# Patient Record
Sex: MALE | Race: WHITE | URBAN - METROPOLITAN AREA
[De-identification: names, ages, dates, MRNs, and addresses within clinical notes are randomized per-mention and may not be internally consistent; named-entity substitution may affect disease eponyms.]

---

## 2023-03-04 ENCOUNTER — HOSPITAL ENCOUNTER (EMERGENCY)
Age: 43
Discharge: HOME | End: 2023-03-04
Attending: EMERGENCY MEDICINE
Payer: OTHER MISCELLANEOUS

## 2023-03-04 ENCOUNTER — APPOINTMENT (OUTPATIENT)
Dept: CT IMAGING | Age: 43
End: 2023-03-04
Payer: OTHER MISCELLANEOUS

## 2023-03-04 ENCOUNTER — APPOINTMENT (OUTPATIENT)
Dept: GENERAL RADIOLOGY | Age: 43
End: 2023-03-04
Payer: OTHER MISCELLANEOUS

## 2023-03-04 VITALS
RESPIRATION RATE: 20 BRPM | BODY MASS INDEX: 28.44 KG/M2 | WEIGHT: 210 LBS | HEART RATE: 97 BPM | HEIGHT: 72 IN | TEMPERATURE: 97.7 F | SYSTOLIC BLOOD PRESSURE: 149 MMHG | OXYGEN SATURATION: 92 % | DIASTOLIC BLOOD PRESSURE: 77 MMHG

## 2023-03-04 DIAGNOSIS — S20.212A RIB CONTUSION, LEFT, INITIAL ENCOUNTER: ICD-10-CM

## 2023-03-04 DIAGNOSIS — T14.90XA TRAUMA: ICD-10-CM
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
## 2023-03-04 LAB
ABSOLUTE EOS #: 0.09 K/UL (ref 0–0.44)
ABSOLUTE IMMATURE GRANULOCYTE: 0.04 K/UL (ref 0–0.3)
ABSOLUTE LYMPH #: 2.49 K/UL (ref 1.1–3.7)
ABSOLUTE MONO #: 0.44 K/UL (ref 0.1–1.2)
ALBUMIN SERPL-MCNC: 5.2 G/DL (ref 3.5–5.2)
ALBUMIN/GLOBULIN RATIO: 2 (ref 1–2.5)
ALP SERPL-CCNC: 77 U/L (ref 40–129)
ALT SERPL-CCNC: 34 U/L (ref 5–41)
ANION GAP SERPL CALCULATED.3IONS-SCNC: 16 MMOL/L (ref 9–17)
AST SERPL-CCNC: 28 U/L
BASOPHILS # BLD: 0 % (ref 0–2)
BASOPHILS ABSOLUTE: <0.03 K/UL (ref 0–0.2)
BILIRUB SERPL-MCNC: 0.6 MG/DL (ref 0.3–1.2)
BILIRUBIN URINE: NEGATIVE
BUN SERPL-MCNC: 18 MG/DL (ref 6–20)
CALCIUM SERPL-MCNC: 9.7 MG/DL (ref 8.6–10.4)
CASTS UA: ABNORMAL /LPF (ref 0–8)
CHLORIDE SERPL-SCNC: 100 MMOL/L (ref 98–107)
CO2 SERPL-SCNC: 21 MMOL/L (ref 20–31)
COLOR: YELLOW
CREAT SERPL-MCNC: 0.98 MG/DL (ref 0.7–1.2)
EOSINOPHILS RELATIVE PERCENT: 1 % (ref 1–4)
EPITHELIAL CELLS UA: ABNORMAL /HPF (ref 0–5)
GFR SERPL CREATININE-BSD FRML MDRD: >60 ML/MIN/1.73M2
GLUCOSE SERPL-MCNC: 108 MG/DL (ref 70–99)
GLUCOSE UR STRIP.AUTO-MCNC: NEGATIVE MG/DL
HCT VFR BLD AUTO: 47.3 % (ref 40.7–50.3)
HGB BLD-MCNC: 16.1 G/DL (ref 13–17)
IMMATURE GRANULOCYTES: 1 %
KETONES UR STRIP.AUTO-MCNC: NEGATIVE MG/DL
LEUKOCYTE ESTERASE UR QL STRIP.AUTO: NEGATIVE
LIPASE SERPL-CCNC: 34 U/L (ref 13–60)
LYMPHOCYTES # BLD: 31 % (ref 24–43)
MCH RBC QN AUTO: 31.9 PG (ref 25.2–33.5)
MCHC RBC AUTO-ENTMCNC: 34 G/DL (ref 28.4–34.8)
MCV RBC AUTO: 93.7 FL (ref 82.6–102.9)
MONOCYTES # BLD: 5 % (ref 3–12)
NITRITE UR QL STRIP.AUTO: NEGATIVE
NRBC AUTOMATED: 0 PER 100 WBC
PDW BLD-RTO: 11.9 % (ref 11.8–14.4)
PLATELET # BLD AUTO: 286 K/UL (ref 138–453)
PMV BLD AUTO: 9.1 FL (ref 8.1–13.5)
POTASSIUM SERPL-SCNC: 3.7 MMOL/L (ref 3.7–5.3)
PROT SERPL-MCNC: 7.8 G/DL (ref 6.4–8.3)
PROT UR STRIP.AUTO-MCNC: 7 MG/DL (ref 5–8)
PROT UR STRIP.AUTO-MCNC: NEGATIVE MG/DL
RBC # BLD: 5.05 M/UL (ref 4.21–5.77)
RBC CLUMPS #/AREA URNS AUTO: ABNORMAL /HPF (ref 0–4)
SEG NEUTROPHILS: 62 % (ref 36–65)
SEGMENTED NEUTROPHILS ABSOLUTE COUNT: 5 K/UL (ref 1.5–8.1)
SODIUM SERPL-SCNC: 137 MMOL/L (ref 135–144)
SPECIFIC GRAVITY UA: 1.01 (ref 1–1.03)
TROPONIN I SERPL DL<=0.01 NG/ML-MCNC: 10 NG/L (ref 0–22)
TROPONIN I SERPL DL<=0.01 NG/ML-MCNC: 8 NG/L (ref 0–22)
TURBIDITY: CLEAR
URINE HGB: ABNORMAL
UROBILINOGEN, URINE: NORMAL
WBC # BLD AUTO: 8.1 K/UL (ref 3.5–11.3)
WBC UA: ABNORMAL /HPF (ref 0–5)

## 2023-03-04 PROCEDURE — 96375 TX/PRO/DX INJ NEW DRUG ADDON: CPT

## 2023-03-04 PROCEDURE — 3209999900 CT THORACIC SPINE TRAUMA RECONSTRUCTION

## 2023-03-04 PROCEDURE — 96374 THER/PROPH/DIAG INJ IV PUSH: CPT

## 2023-03-04 PROCEDURE — 81001 URINALYSIS AUTO W/SCOPE: CPT

## 2023-03-04 PROCEDURE — 72131 CT LUMBAR SPINE W/O DYE: CPT

## 2023-03-04 PROCEDURE — 6360000004 HC RX CONTRAST MEDICATION: Performed by: STUDENT IN AN ORGANIZED HEALTH CARE EDUCATION/TRAINING PROGRAM

## 2023-03-04 PROCEDURE — 6370000000 HC RX 637 (ALT 250 FOR IP): Performed by: EMERGENCY MEDICINE

## 2023-03-04 PROCEDURE — 93005 ELECTROCARDIOGRAM TRACING: CPT | Performed by: STUDENT IN AN ORGANIZED HEALTH CARE EDUCATION/TRAINING PROGRAM

## 2023-03-04 PROCEDURE — 3209999900 CT LUMBAR SPINE TRAUMA RECONSTRUCTION

## 2023-03-04 PROCEDURE — 99285 EMERGENCY DEPT VISIT HI MDM: CPT

## 2023-03-04 PROCEDURE — 71260 CT THORAX DX C+: CPT

## 2023-03-04 PROCEDURE — 71045 X-RAY EXAM CHEST 1 VIEW: CPT

## 2023-03-04 PROCEDURE — 73562 X-RAY EXAM OF KNEE 3: CPT

## 2023-03-04 PROCEDURE — 70450 CT HEAD/BRAIN W/O DYE: CPT

## 2023-03-04 PROCEDURE — 2580000003 HC RX 258: Performed by: STUDENT IN AN ORGANIZED HEALTH CARE EDUCATION/TRAINING PROGRAM

## 2023-03-04 PROCEDURE — 85025 COMPLETE CBC W/AUTO DIFF WBC: CPT

## 2023-03-04 PROCEDURE — 83690 ASSAY OF LIPASE: CPT

## 2023-03-04 PROCEDURE — 80053 COMPREHEN METABOLIC PANEL: CPT

## 2023-03-04 PROCEDURE — 96376 TX/PRO/DX INJ SAME DRUG ADON: CPT

## 2023-03-04 PROCEDURE — 84484 ASSAY OF TROPONIN QUANT: CPT

## 2023-03-04 PROCEDURE — 72125 CT NECK SPINE W/O DYE: CPT

## 2023-03-04 PROCEDURE — 6360000002 HC RX W HCPCS: Performed by: STUDENT IN AN ORGANIZED HEALTH CARE EDUCATION/TRAINING PROGRAM

## 2023-03-04 RX ORDER — FENTANYL CITRATE 50 UG/ML
50 INJECTION, SOLUTION INTRAMUSCULAR; INTRAVENOUS ONCE
Status: COMPLETED | OUTPATIENT
Start: 2023-03-04 | End: 2023-03-04

## 2023-03-04 RX ORDER — 0.9 % SODIUM CHLORIDE 0.9 %
1000 INTRAVENOUS SOLUTION INTRAVENOUS ONCE
Status: COMPLETED | OUTPATIENT
Start: 2023-03-04 | End: 2023-03-04

## 2023-03-04 RX ORDER — ACETAMINOPHEN 500 MG
1000 TABLET ORAL ONCE
Status: COMPLETED | OUTPATIENT
Start: 2023-03-04 | End: 2023-03-04

## 2023-03-04 RX ORDER — LIDOCAINE 50 MG/G
1 PATCH TOPICAL DAILY
Qty: 30 PATCH | Refills: 0 | Status: SHIPPED | OUTPATIENT
Start: 2023-03-04

## 2023-03-04 RX ORDER — IBUPROFEN 800 MG/1
800 TABLET ORAL EVERY 8 HOURS PRN
Qty: 21 TABLET | Refills: 0 | Status: SHIPPED | OUTPATIENT
Start: 2023-03-04

## 2023-03-04 RX ORDER — ACETAMINOPHEN 500 MG
500 TABLET ORAL EVERY 8 HOURS PRN
Qty: 21 TABLET | Refills: 1 | Status: SHIPPED | OUTPATIENT
Start: 2023-03-04

## 2023-03-04 RX ORDER — ONDANSETRON 2 MG/ML
4 INJECTION INTRAMUSCULAR; INTRAVENOUS ONCE
Status: COMPLETED | OUTPATIENT
Start: 2023-03-04 | End: 2023-03-04

## 2023-03-04 RX ORDER — OXYCODONE HYDROCHLORIDE 5 MG/1
5 TABLET ORAL EVERY 12 HOURS PRN
Qty: 6 TABLET | Refills: 0 | Status: SHIPPED | OUTPATIENT
Start: 2023-03-04 | End: 2023-03-07

## 2023-03-04 RX ORDER — METHOCARBAMOL 500 MG/1
500 TABLET, FILM COATED ORAL 3 TIMES DAILY
Qty: 21 TABLET | Refills: 0 | Status: SHIPPED | OUTPATIENT
Start: 2023-03-04 | End: 2023-03-11

## 2023-03-04 RX ADMIN — FENTANYL CITRATE 50 MCG: 50 INJECTION, SOLUTION INTRAMUSCULAR; INTRAVENOUS at 08:27

## 2023-03-04 RX ADMIN — ACETAMINOPHEN 1000 MG: 500 TABLET ORAL at 09:00

## 2023-03-04 RX ADMIN — ONDANSETRON 4 MG: 2 INJECTION INTRAMUSCULAR; INTRAVENOUS at 08:27

## 2023-03-04 RX ADMIN — SODIUM CHLORIDE 1000 ML: 9 INJECTION, SOLUTION INTRAVENOUS at 08:26

## 2023-03-04 RX ADMIN — FENTANYL CITRATE 50 MCG: 50 INJECTION, SOLUTION INTRAMUSCULAR; INTRAVENOUS at 10:09

## 2023-03-04 RX ADMIN — IOPAMIDOL 75 ML: 755 INJECTION, SOLUTION INTRAVENOUS at 09:43

## 2023-03-04 ASSESSMENT — ENCOUNTER SYMPTOMS
ABDOMINAL PAIN: 0
VOMITING: 0
DIARRHEA: 0
RHINORRHEA: 0
NAUSEA: 0
COUGH: 0
SHORTNESS OF BREATH: 0

## 2023-03-04 ASSESSMENT — PAIN SCALES - GENERAL
PAINLEVEL_OUTOF10: 9
PAINLEVEL_OUTOF10: 10
PAINLEVEL_OUTOF10: 9
PAINLEVEL_OUTOF10: 8

## 2023-03-04 ASSESSMENT — PAIN DESCRIPTION - LOCATION: LOCATION: CHEST

## 2023-03-04 ASSESSMENT — PAIN - FUNCTIONAL ASSESSMENT: PAIN_FUNCTIONAL_ASSESSMENT: 0-10

## 2023-03-04 NOTE — ED NOTES
Pt placed on cardiac monitor, BP cuff, and pulse ox. Alarms set.       Bertrand Emerson RN  03/04/23 2308
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
## 2023-03-04 NOTE — ED NOTES
The following labs were labeled with appropriate pt sticker and tubed to lab:     [] Blue     [] Lavender   [] on ice  [x] Green/yellow  [] Green/black [] on ice  [] Augusta Chata  [] on ice  [] Yellow  [] Red  [] Type/ Screen  [] ABG  [] VBG    [] COVID-19 swab    [] Rapid  [] PCR  [] Flu swab  [] Peds Viral Panel     [] Urine Sample  [] Fecal Sample  [] Pelvic Cultures  [] Blood Cultures  [] X 2  [] STREP Cultures       Annie Valdez RN  03/04/23 4628
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
## 2023-03-04 NOTE — ED NOTES
Urine sample collected from pt,  labeled and sent to lab for testing.       Bertrand Emerson RN  03/04/23 9850

## 2023-03-04 NOTE — Clinical Note
Royal Gonzalez was seen and treated in our emergency department on 3/4/2023. He may return to work on 03/06/2023. If you have any questions or concerns, please don't hesitate to call.       Doris Merrill, DO

## 2023-03-04 NOTE — DISCHARGE INSTRUCTIONS
Thank you for visiting [facility]. For your symptoms we checked labs to look at your blood sugar, blood counts, electrolytes and kidney function, liver, gallbladder, heart. These were all normal.  Your EKG was also normal.  Your urine did show hemoglobin but did not show red blood cells. This can sometimes be a sign that there is some bleeding after an injury to your kidney, however your ultrasounds and CT scans were normal.  I would just recommend that you have your primary care doctor follow this up or that you return if you see blood visually in your urine or having any trouble urinating. Your imaging showed no injuries to your organs, lungs or fractures. I do want to treat you the same as if you have a rib fracture in order to prevent pneumonia from the pain with breathing like we discussed    Treat yourself at home  For pain and fever you can take Tylenol and Ibuprofen. For around the clock symptoms control, you can alternate them every 3 hours. For example, if you take Tylenol at 9 am, you can take Ibuprofen at 12 pm, Tylenol at 3 pm, Ibuprofen at 6 pm, etc. You do not need to wake up from sleep to take these medications and can decrease the amount you are taking once your symptoms improve. If your pain is not controlled on the Tylenol and ibuprofen you can take oxycodone 5 mg every 12 hours as needed. This is a narcotic pain medication so do not drive or operate heavy machinery while taking this and decrease the amount you are taking once your symptoms improve  Take Robaxin 500 mg every 8 hours as needed for muscle spasm. This can make you sleepy so do not drive or operate heavy machinery on this medication either  You can use lidocaine patches directly over the ribs. You place 1 in the morning and wear it all day long. You then take it off at night and do not sleep with it.   You place a new patch in the morning    Use the incentive spirometer as demonstrated for you.  Take 2 breaths and every 2 hours while you are awake    If you develop any worsening of your symptoms, severe pain, chest pain, trouble breathing, passing out, vomiting or other worrisome symptoms please return to the emergency department immediately.     Please call your primary care provider as soon as possible for follow up

## 2023-03-04 NOTE — ED NOTES
Pt states pain remains on the left side, pt asking for additional pain medication.      Gabi Alfonso RN  03/04/23 0921     Home

## 2023-03-06 LAB
EKG ATRIAL RATE: 117 BPM
EKG P AXIS: 67 DEGREES
EKG P-R INTERVAL: 168 MS
EKG Q-T INTERVAL: 326 MS
EKG QRS DURATION: 100 MS
EKG QTC CALCULATION (BAZETT): 454 MS
EKG R AXIS: 76 DEGREES
EKG T AXIS: 16 DEGREES
EKG VENTRICULAR RATE: 117 BPM